# Patient Record
Sex: MALE | URBAN - METROPOLITAN AREA
[De-identification: names, ages, dates, MRNs, and addresses within clinical notes are randomized per-mention and may not be internally consistent; named-entity substitution may affect disease eponyms.]

---

## 2020-05-29 ENCOUNTER — OFFICE VISIT (OUTPATIENT)
Dept: URBAN - METROPOLITAN AREA CLINIC 33 | Facility: CLINIC | Age: 75
End: 2020-05-29
Payer: MEDICARE

## 2020-05-29 DIAGNOSIS — H01.025 SQUAMOUS BLEPHARITIS LEFT LOWER EYELID: Primary | ICD-10-CM

## 2020-05-29 DIAGNOSIS — H52.03 HYPERMETROPIA, BILATERAL: ICD-10-CM

## 2020-05-29 DIAGNOSIS — H17.89 OTHER CORNEAL SCARS AND OPACITIES: ICD-10-CM

## 2020-05-29 PROCEDURE — 99204 OFFICE O/P NEW MOD 45 MIN: CPT | Performed by: OPTOMETRIST

## 2020-05-29 ASSESSMENT — INTRAOCULAR PRESSURE
OD: 10
OS: 12

## 2020-05-29 ASSESSMENT — KERATOMETRY
OD: 38.38
OS: 37.75

## 2020-05-29 ASSESSMENT — VISUAL ACUITY
OS: 20/40
OD: 20/40

## 2020-05-29 NOTE — IMPRESSION/PLAN
Impression: Other corneal scars and opacities: H17.89. Plan: RK cuts OU causing corneal surface irregularity leading to decreased BCVA. Monitor only at this time.

## 2020-05-29 NOTE — IMPRESSION/PLAN
Impression: Age-related nuclear cataract, bilateral: H25.13. Plan: Cataracts account for the patient's complaints. Discussed options, surgery or spectacle change. Explained surgery risks, benefits, procedures and recovery. Patient defers surgery and elects to change glasses first.  If there is no improvement, ok to schedule surgery. Cataract OS<OD. Patient reports vision worsening OS, does not appear to be from cataract based on slit lamp examination.

## 2020-05-29 NOTE — IMPRESSION/PLAN
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 Plan: Blepharitis accounts for the patient's complaints. The condition appears chronic and eyelid scrubs and warm compresses have been suggested. The patient understands this may not cure the condition and that there may be the need to be on a maintenance program. The patient was informed that the condition may take a few weeks to improve. 

Ocusoft lid scrubs, warm compress, AT's

## 2021-07-19 ENCOUNTER — OFFICE VISIT (OUTPATIENT)
Dept: URBAN - METROPOLITAN AREA CLINIC 33 | Facility: CLINIC | Age: 76
End: 2021-07-19
Payer: MEDICARE

## 2021-07-19 DIAGNOSIS — H25.13 AGE-RELATED NUCLEAR CATARACT, BILATERAL: Primary | ICD-10-CM

## 2021-07-19 DIAGNOSIS — H43.813 VITREOUS DEGENERATION, BILATERAL: ICD-10-CM

## 2021-07-19 PROCEDURE — 99213 OFFICE O/P EST LOW 20 MIN: CPT | Performed by: OPTOMETRIST

## 2021-07-19 ASSESSMENT — INTRAOCULAR PRESSURE
OS: 12
OD: 9

## 2021-07-19 ASSESSMENT — VISUAL ACUITY
OD: 20/25
OS: 20/30

## 2021-07-19 NOTE — IMPRESSION/PLAN
Impression: Age-related nuclear cataract, bilateral: H25.13. Plan: Cataracts account for the patient's complaints. Patient content with current vision. No treatment currently recommended. The patient will monitor vision changes and contact us with any decrease in vision.

## 2021-07-19 NOTE — IMPRESSION/PLAN
Impression: Other corneal scars and opacities: H17.89. Plan: S/P RK OU. No treatment needed. Monitor yearly. vital signs normal or at patient baseline -Met  -infection is improving -Met  -safe disposition plan has been identified-Not met. Placement pending; SW following.       Pt A&O. VSS on RA. Turned and repo Q2hrs. Helm in place, pain managed with Oxycodone.

## 2022-06-30 ENCOUNTER — OFFICE VISIT (OUTPATIENT)
Dept: URBAN - METROPOLITAN AREA CLINIC 33 | Facility: CLINIC | Age: 77
End: 2022-06-30
Payer: MEDICARE

## 2022-06-30 DIAGNOSIS — H17.89 OTHER CORNEAL SCARS AND OPACITIES: ICD-10-CM

## 2022-06-30 DIAGNOSIS — H43.813 VITREOUS DEGENERATION, BILATERAL: ICD-10-CM

## 2022-06-30 DIAGNOSIS — H25.13 AGE-RELATED NUCLEAR CATARACT, BILATERAL: Primary | ICD-10-CM

## 2022-06-30 PROCEDURE — 99213 OFFICE O/P EST LOW 20 MIN: CPT | Performed by: OPTOMETRIST

## 2022-06-30 ASSESSMENT — INTRAOCULAR PRESSURE
OD: 10
OS: 10

## 2022-06-30 ASSESSMENT — KERATOMETRY
OD: 38.25
OS: 37.38

## 2022-06-30 ASSESSMENT — VISUAL ACUITY
OS: 20/30
OD: 20/30

## 2022-06-30 NOTE — IMPRESSION/PLAN
Impression: Vitreous degeneration, bilateral: H43.813. Plan: Condition is long-standing and well tolerated. No signs of retina tears or detachment.

## 2022-06-30 NOTE — IMPRESSION/PLAN
Impression: Age-related nuclear cataract, bilateral: H25.13. Plan: Cataracts account for the patient's complaints. No treatment currently recommended. The patient will monitor vision changes and contact us with any decrease in vision. Patient satisfied with current level of vision. Patient denies difficulty with night time vision at this time.

## 2022-06-30 NOTE — IMPRESSION/PLAN
Impression: Other corneal scars and opacities: H17.89. Plan: H/O of RK scars in both eyes. Impairs quality of vision but is well tolerated by patient. No need for treatment.